# Patient Record
(demographics unavailable — no encounter records)

---

## 2024-10-23 NOTE — PHYSICAL EXAM
[Alert] : alert [Well Nourished] : well nourished [Normal Affect] : the affect was normal [Normal Mood] : the mood was normal

## 2024-10-23 NOTE — HISTORY OF PRESENT ILLNESS
[FreeTextEntry1] : Diagnosed in 2017  Diabetes associated complications: PN b/L Last eye exam: 2-2.5 yrs ago  Podiatry: 5/2024  Home diabetes medications: Basaglar 50 units HS Novolog 12-15 units pre meals (skips dose if he skips a meal)  Metformin 500 mg x2 BID (4 tabs a day) Trulicity 0.75 mg weekly  Medications previously tried: Ozempic 0.50 mg weekly - STOPPED due to GI upset  Glimepiride 4 mg BID   SMBG/CGM: Shauna  14 days average 253, %CV 24.1 >250  50% 181-250  37%   13% <70  0%  Hypoglycemia: none Severe hypoglycemia: none   Dietary patterns: Low carb 2-3 meals per day     Labs: 5/2024 A1C: 10.3% (5/2024), 10.1% (10/2024)  10/2024 Creatinine:  0.78 GFR: 103

## 2024-10-25 NOTE — HISTORY OF PRESENT ILLNESS
[FreeTextEntry1] : Oct 21, 2024    in person  PCP:  Dr. Ene Page    CC:   Diabetes             (BMI 41)               10/2017 cellulitis admission  A1c 13%;   8/2023:  8.2%                            at that time on Basaglar and metformin 1000 mg BID.  .                        urine microal/creat ratio 343           (Hx kidney stones)  58 yo visits for diabetes.  A1c in November 2022 was 10%;  August 2023  8.2% Two daughters in [place]    He remains on   Basaglar 26 -> 12 units in PM-> 28 units  and Novolog 4 -> 8 units TID BS > 140 (and having a meal) -> 50 units Insulin Aspart Flexpen 10-15 units TID  glimepiride 4 mg BID - STOPPED   metformin 500 mg x2   BID (4 tabs a day)  Ozempic 0.50 mg weekly - STOPPED  -> Trulicity  0.75 mg/week   ([employer] not covered)    Libre3 data reviewed.  GMI 9.   Reproducible spikes after meals.  : : Constitutional:  Alert, well nourished, healthy appearance, no acute distress  Eyes:  No proptosis, no stare Thyroid:  normal to palpation Pulmonary:  No respiratory distress, no accessory muscle use; normal rate and effort Cardiac:  Normal rate Vascular:  Endocrine:  No stigmata of Cushings Syndrome Musculoskeletal:  Normal gait, no involuntary movements Neurology:  No tremors Affect/Mood/Psych:  Oriented x 3; normal affect, normal insight/judgement, normal mood  . Impression:  Very sensitve to carbs. Traveling less. Plan:  Same Rx.   He has found input from CDE-NP Ольга Baca very helpful      Aug 05, 2024     in person    PCP:  Dr. Ene Page    CC:   Diabetes             (BMI 41)               10/2017 cellulitis admission  A1c 13%;   8/2023:  8.2%                            at that time on Basaglar and metformin 1000 mg BID.  .                        urine microal/creat ratio 343           (Hx kidney stones)  58 yo visits for diabetes.  A1c in November 2022 was 10%;  August 2023  8.2% Two daughters in [place]     He remains on   Basaglar 26 -> 12 units in PM-> 28 units  and Novolog 4 -> 8 units TID BS > 140 (and having a meal)  glimepiride 4 mg BID  metformin 500 mg x2   BID (4 tabs a day)  Ozempic 0.50 mg weekly - STOPPED    : : Constitutional:  Alert, well nourished, healthy appearance, no acute distress  Eyes:  No proptosis, no stare Thyroid: Pulmonary:  No respiratory distress, no accessory muscle use; normal rate and effort Cardiac:  Normal rate Vascular:  Endocrine:  No stigmata of Cushings Syndrome Musculoskeletal:  Normal gait, no involuntary movements Neurology:  No tremors Affect/Mood/Psych:  Oriented x 3; normal affect, normal insight/judgement, normal mood  . Impression:  Overworked. A1c over 10% Needs fewer carbs, more insulin, more activity.  Plan: In short run, increase Lantus by 2 units every PM until FBS better. f/u to NP MAYANKE Ольга Baca.      - May 06, 2024      in person     PCP:  Dr. Ene Page    CC:   Diabetes             (BMI 41)               10/2017 cellulitis admission  A1c 13%;   8/2023:  8.2%                            at that time on Basaglar and metformin 1000 mg BID.  .                        urine microal/creat ratio 343           (Hx kidney stones)  58 yo visits for diabetes.  A1c in November 2022 was 10%;  August 2023  8.2%  He has been traveling for work and developed a vitral syndrome (home Covid test negative) Some GI upset.    He remains on   Basaglar 26 -> 12 units in PM and Novolog 4 -> 8 units TID BS > 140 (and having a meal)  glimepiride 4 mg BID  metformin 500 mg x2   BID (4 tabs a day)  Ozempic 0.50 mg weekly - STOPPED    Impression/Plan;  Appears to be doing excellent job of monitoring and controlling diabetes. Same Rx for now sans the Ozempic. Updated A1c today     Sep 18, 2023      vid chat (last minute change b/o virus symptoms)   PCP:  Dr. Ene Page    CC:   Diabetes             (BMI 41)               10/2017 cellulitis admission  A1c 13%;   8/2023:  8.2%                            at that time on Basaglar and metformin 1000 mg BID.  .                        urine microal/creat ratio 343           (Hx kidney stones)  57 yo visits for diabetes.  A1c in November 2022 was 10%;  August 2023  8.2%  He has been traveling for work and developed a vitral syndrome (home Covid test negative) Some GI upset.     Basaglar 26 -> 12 units in PM and Novolog 4 -> 8 units TID BS > 140 (and having a meal)  glimepiride 4 mg BID  metformin 500 mg x2   BID (4 tabs a day)  Ozempic 0.50 mg weekly   Impression:  Using Freestyle Shauna 3 ([facility])  Reports sugars in good range. Last A1c was down to 8.2% so will aim for next A1c prior to December visit. Still needs to see ophthalmology.  Plan:  Updated labs prior to December visit. Can consider SGLT-2 now that sugars are under better control     Aug 21, 2023      in person       his company is [employer]  PCP:  Dr. Ene Page    CC:   Diabetes             (BMI 41)               10/2017 cellulitis admission  A1c 13%                            at that time on Basaglar and metformin 1000 mg BID.  .                        urine microal/creat ratio 343           (Hx kidney stones)  57 yo visits for diabetes.  A1c in November was 10%.  He explains that because of stress and work, he has not had time to focus on the diabetes.     Basaglar 26 units in PM and Novolog 4 units TID BS > 140 (and having a meal)  glimepiride 4 mg BID  metformin 500 mg BID  Ozempic 0.50  : : Constitutional:  Alert, well nourished, healthy appearance, no acute distress  Eyes:  No proptosis, no stare Thyroid: Pulmonary:  No respiratory distress, no accessory muscle use; normal rate and effort Cardiac:  Normal rate Vascular:  Endocrine:  No stigmata of Cushings Syndrome Musculoskeletal:  Normal gait, no involuntary movements Neurology:  No tremors Affect/Mood/Psych:  Oriented x 3; normal affect, normal insight/judgement, normal mood  . Impression:  Continues to suffer from stress eating. Plan:  Increase Basaglar to generate FBS around 140 Increase Novolog to 6 units TID to prevent spikes after meals  Increase ozempic from 0.5 to 1.0 mg/week.    Annual eye exam.   Imp:   A1c trajectory down from over 10 to 8.2% by attention  to diet.   Plan:  Increae Ozempic to 1.0 mg/week.    Jan 30, 2023  in person  CC:   Diabetes             (BMI 41)               10/2017 cellulitis admission  A1c 13%                            at that time on Basaglar and metformin 1000 mg BID.  .                        urine microal/creat ratio 343           (Hx kidney stones)  57 yo visits for diabetes.  A1c in November was 10%.  He explains that because of stress and work, he has not had time to focus on the diabetes.     Basaglar 26 units in PM and Novolog 4 units TID BS > 140 (and having a meal)  glimepiride 4 mg BID  metformin 500 mg BID  Ozempic 0.25  : : Constitutional:  Alert, well nourished, healthy appearance, no acute distress  Eyes:  No proptosis, no stare Thyroid: Pulmonary:  No respiratory distress, no accessory muscle use; normal rate and effort Cardiac:  Normal rate Vascular:  Endocrine:  No stigmata of Cushing's Syndrome Musculoskeletal:  Normal gait, no involuntary movements Neurology:  No tremors Affect/Mood/Psych:  Oriented x 3; normal affect, normal insight/judgement, normal mood  .  Impression:  Review of Libre3 data shows room to increase Basaglar until  FBS around 140. Increase the Novolog to 6 units.  : : Constitutional:  Alert, well nourished, healthy appearance, no acute distress  Eyes:  No proptosis, no stare Thyroid: Pulmonary:  No respiratory distress, no accessory muscle use; normal rate and effort Cardiac:  Normal rate Vascular:  Endocrine:  No stigmata of Cushing's Syndrome Musculoskeletal:  Normal gait, no involuntary movements Neurology:  No tremors Affect/Mood/Psych:  Oriented x 3; normal affect, normal insight/judgement, normal mood  .  Impression:  Room for improvement. Plan:  [facility] to [facility]in [place].  at [facility].            Jan 30, 2023    in person  CC:   Diabetes             (BMI 41)               10/2017 cellulitis admission  A1c 13%                            at that time on Basaglar and metformin 1000 mg BID.  .                        urine microal/creat ratio 343           (Hx kidney stones)  57 yo visits for diabetes.  A1c in November was 10%, so I encouraged him to come in today.   He explains that because of stress and work, he has not had time to focus on the diabetes.     Basaglar 14 units in PM and Novolog 4 units TID BS > 140 (and having a meal)  glimepiride 4 mg BID  metformin 500 mg BID  In Nov 2020 insulin 21.1 when  In July, when BS was 212, C-peptide was only 4.9    Using Shauna 2 (from [employer]) $25/month    : :  Plan:  Shauna 3 yumiko installed and he will try out the Shauna 3 Even if it is not covered by his insurance, he would like to continue with it.   I have asked him to increase the Basaglar inssulin by 1 unit every 2 days until he is waking up with FBS around 140 mg/dl  Nov 28, 2022    in person  CC:   Diabetes             (BMI 41)               10/2017 cellulitis admission  A1c 13%                            at that time on Basaglar and metformin 1000 mg BID.  .                        urine microal/creat ratio 343           (Hx kidney stones)  57 yo visits for diabetes.   Basaglar 12 units in PM and Novolog 4 units TID BS > 140 (and having a meal)  glimepiride 4 mg BID  metformin 500 mg BID  In Nov 2020 insulin 21.1 when  In July, when BS was 212, C-peptide was only 4.9    Using Shauna 2 (from [employer]) $25/month    : : Constitutional:  Alert, well nourished, healthy appearance, no acute distress  Eyes:  No proptosis, no stare Thyroid: Pulmonary:  No respiratory distress, no accessory muscle use; normal rate and effort Cardiac:  Normal rate Vascular:  Endocrine:  No stigmata of Cushing's Syndrome Musculoskeletal:  Normal gait, no involuntary movements Neurology:  No tremors Affect/Mood/Psych:  Oriented x 3; normal affect, normal insight/judgement, normal mood  .   Impression:  Available data shows blood sugars still quite elevated despite use of CGM, insulin. Plan:  Will negotiate with him for nutritional counseling.    Jul 19, 2022      in person  PCP:   Dr. Ene Page and Dr. Gamez           GI:  Dr. Quiroga in [place]  CC:   Diabetes             10/2017 cellulitis admission  A1c 13%                            at that time on Basaglar and metformin 1000 mg BID.  .          (Hx kidney stones)    58 yo visits for diabetes.   Last seen May 2021 and April 2022.   Tried CGM - fell off though.   Taking glimepiride 4 mg, one BID but he can take both in the evening.  and metformin 500 mg BID    4/2022 A1c 10.6 %    He finds Contour more accurate than his previous TrueMetrix.   Sugars up to 260 when he had Covid 19 in March, but FBS still elevated. around 200 mg/dl   Meter not available today.    : : Constitutional:  Alert, well nourished, healthy appearance, no acute distress  Eyes:  No proptosis, no stare Thyroid:  Normal to palpation Pulmonary:  No respiratory distress, no accessory muscle use; normal rate and effort Cardiac:  Normal rate Vascular:  Endocrine:  No stigmata of Cushing's Syndrome Musculoskeletal:  Normal gait, no involuntary movements Neurology:  No tremors Affect/Mood/Psych:  Oriented x 3; normal affect, normal insight/judgement, normal mood  .  Impression:  Most recent A1c 10.6% Evolved into Type 1 diabetees. Previously tried GLP-1 agonist, but upset his stomach  Plan:  Basaglar 12 units in PM and Novolog 4 units TID BS > 140 (and having a meal) ROV in November and January.       Apr 06, 2022       PCP:   Dr. Ene Page           GI:  Dr. Quiroga in [place]  CC:   Diabetes  55 yo visits for diabetes.   Last seen May 2021. Taking glimepiride 4 mg, two in PM and metformin 500 mg BID  He believes that it is likely A1c will have drifted up.  : : Constitutional:  Alert, well nourished, healthy appearance, no acute distress  Eyes:  No proptosis, no stare Thyroid: Pulmonary:  No respiratory distress, no accessory muscle use; normal rate and effort Cardiac:  Normal rate Vascular:  Endocrine:  No stigmata of Cushing's Syndrome Musculoskeletal:  Normal gait, no involuntary movements Neurology:  No tremors Affect/Mood/Psych:  Oriented x 3; normal affect, normal insight/judgement, normal mood  .  Imp/Plan:  Labs today, call me one week.  Return by July.       May 05, 2021    telephonic (iPhone does not )  PCP:   Dr. Ene Page           GI:  Dr. Quiroga in [place]   CC:   Diabetes  55 yo visits for diabetes.    Taking glimepiride 4 mg, two in PM and metformin 500 mg BID  Has used Shauna 14; but it tends to fall off, so he is using fingerstick BS and reports afternoon within normal range but fasting still somewhat elevated.  Impression:  A1c trending in good direction. May benefit from addition of GLP-1 and/or SGLT-2 inhibitor.  Plan:  Updated labs at [facility] (Rx mailed to him), included repeat Te. ROV in about 3 months.    He will also f/u PCP - Dr. Page.  Annual eye exam   Jan 26, 2021     videochat iPhone  PCP:   Dr. Ene Page           GI:  Dr. Quiroga in [place]   CC:   Diabetes  55 yo visits for diabetes.   At November visit, started on glimepiride and metform and has tolerated them well. No hypoglycemia.  Using Freestyle Shauna 2 as well and he finds it educational and helpful.   A1c yesterday 7.8%, down from 11.4 in November U/A shows significant microalbuminuria.   On ACEI.  Impression:  He has made significant progress in controlling the sugars.   He understands ADA goals and that we will be working on even more improvement. he has had eyes checked, has not needed podiatrist, anticipates a routine GI evaluation.  Plan:  Rx renewed and encouraged him to visit in person by April 2021.     Nov 04, 2020     in person  PCP:   Dr. Ene Page           GI:  Dr. 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  On no medications for the diabetes at the present time.     Took Basaglar in the past.  Examination:  Constitutional:  Alert, well nourished, healthy appearance, no acute distress  Eyes:  No proptosis, no lid lag Thyroid: Pulmonary:  No respiratory distress, no accessory muscle use; normal rate and effort Cardiac:  Normal rate Vascular:  Endocrine:  No stigmata of Cushing's Syndrome Musculoskeletal:  Normal gait, no involuntary movements Neurology:  No tremors Affect/Mood/Psych:  Oriented x 3; normal affect, normal insight/judgement, normal mood  .   Impression:  Likely that diabetes is under loose control as Mr. Hammer has not had time to monitor sugar levels. He seems eager to restart.    Plan:  glimepiride 4 mg bId metformin 500 mg BID Call me on my cell phone over the weekend to review fingerstick sugar and lab results     2018 medications:     Taking OneTouch Verio - Strip as directed In Vitro check sugar twice daily, Notes: dx code E11.9, Taking OneTouch Delica Lancets Fine - Miscellaneous as directed subcutaneously check sugar twice daily, Notes: dx code E11.9, Taking Aspir-81 81 MG Tablet Delayed Release 1 tablet Orally Once a day, Taking Lisinopril 5 MG Tablet 1 tablet Orally Once a day, Taking MetFORMIN HCl  MG Tablet Extended Release 24 Hour 2 tablets Orally twice daily with food, Taking Trulicity 0.75 MG/0.5ML Solution Pen-injector as directed Subcutaneous once weekly, Taking Dexamethasone 1 MG Tablet 1 tablet Orally take at 11pm the night before blood work, Taking Basaglar KwikPen 100 UNIT/ML Solution Pen-injector 16 units Subcutaneous bedtime, Taking Ergocalciferol 26163 UNIT Capsule 1 capsule Orally once weekly, Not-Taking Trulicity 0.75 MG/0.5ML Solution Pen-injector 0.5 ml Subcutaneous once weekly, Not-Taking Victoza 18 MG/3ML Solution Pen-injector 0.6mg daily x 1 week then increase to 1.2mg daily subcutaneously , Not-Taking Acidophilus - Capsule Orally

## 2024-10-25 NOTE — HISTORY OF PRESENT ILLNESS
[FreeTextEntry1] : Oct 21, 2024    in person  PCP:  Dr. Ene Page    CC:   Diabetes             (BMI 41)               10/2017 cellulitis admission  A1c 13%;   8/2023:  8.2%                            at that time on Basaglar and metformin 1000 mg BID.  .                        urine microal/creat ratio 343           (Hx kidney stones)  58 yo visits for diabetes.  A1c in November 2022 was 10%;  August 2023  8.2% Two daughters in [place]    He remains on   Basaglar 26 -> 12 units in PM-> 28 units  and Novolog 4 -> 8 units TID BS > 140 (and having a meal) -> 50 units Insulin Aspart Flexpen 10-15 units TID  glimepiride 4 mg BID - STOPPED   metformin 500 mg x2   BID (4 tabs a day)  Ozempic 0.50 mg weekly - STOPPED  -> Trulicity  0.75 mg/week   ([employer] not covered)    Libre3 data reviewed.  GMI 9.   Reproducible spikes after meals.  : : Constitutional:  Alert, well nourished, healthy appearance, no acute distress  Eyes:  No proptosis, no stare Thyroid:  normal to palpation Pulmonary:  No respiratory distress, no accessory muscle use; normal rate and effort Cardiac:  Normal rate Vascular:  Endocrine:  No stigmata of Cushings Syndrome Musculoskeletal:  Normal gait, no involuntary movements Neurology:  No tremors Affect/Mood/Psych:  Oriented x 3; normal affect, normal insight/judgement, normal mood  . Impression:  Very sensitve to carbs. Traveling less. Plan:  Same Rx.   He has found input from CDE-NP Ольга Baca very helpful      Aug 05, 2024     in person    PCP:  Dr. Ene Page    CC:   Diabetes             (BMI 41)               10/2017 cellulitis admission  A1c 13%;   8/2023:  8.2%                            at that time on Basaglar and metformin 1000 mg BID.  .                        urine microal/creat ratio 343           (Hx kidney stones)  58 yo visits for diabetes.  A1c in November 2022 was 10%;  August 2023  8.2% Two daughters in [place]     He remains on   Basaglar 26 -> 12 units in PM-> 28 units  and Novolog 4 -> 8 units TID BS > 140 (and having a meal)  glimepiride 4 mg BID  metformin 500 mg x2   BID (4 tabs a day)  Ozempic 0.50 mg weekly - STOPPED    : : Constitutional:  Alert, well nourished, healthy appearance, no acute distress  Eyes:  No proptosis, no stare Thyroid: Pulmonary:  No respiratory distress, no accessory muscle use; normal rate and effort Cardiac:  Normal rate Vascular:  Endocrine:  No stigmata of Cushings Syndrome Musculoskeletal:  Normal gait, no involuntary movements Neurology:  No tremors Affect/Mood/Psych:  Oriented x 3; normal affect, normal insight/judgement, normal mood  . Impression:  Overworked. A1c over 10% Needs fewer carbs, more insulin, more activity.  Plan: In short run, increase Lantus by 2 units every PM until FBS better. f/u to NP MAYANKE Ольга Baca.      - May 06, 2024      in person     PCP:  Dr. Ene Page    CC:   Diabetes             (BMI 41)               10/2017 cellulitis admission  A1c 13%;   8/2023:  8.2%                            at that time on Basaglar and metformin 1000 mg BID.  .                        urine microal/creat ratio 343           (Hx kidney stones)  58 yo visits for diabetes.  A1c in November 2022 was 10%;  August 2023  8.2%  He has been traveling for work and developed a vitral syndrome (home Covid test negative) Some GI upset.    He remains on   Basaglar 26 -> 12 units in PM and Novolog 4 -> 8 units TID BS > 140 (and having a meal)  glimepiride 4 mg BID  metformin 500 mg x2   BID (4 tabs a day)  Ozempic 0.50 mg weekly - STOPPED    Impression/Plan;  Appears to be doing excellent job of monitoring and controlling diabetes. Same Rx for now sans the Ozempic. Updated A1c today     Sep 18, 2023      vid chat (last minute change b/o virus symptoms)   PCP:  Dr. Ene Page    CC:   Diabetes             (BMI 41)               10/2017 cellulitis admission  A1c 13%;   8/2023:  8.2%                            at that time on Basaglar and metformin 1000 mg BID.  .                        urine microal/creat ratio 343           (Hx kidney stones)  57 yo visits for diabetes.  A1c in November 2022 was 10%;  August 2023  8.2%  He has been traveling for work and developed a vitral syndrome (home Covid test negative) Some GI upset.     Basaglar 26 -> 12 units in PM and Novolog 4 -> 8 units TID BS > 140 (and having a meal)  glimepiride 4 mg BID  metformin 500 mg x2   BID (4 tabs a day)  Ozempic 0.50 mg weekly   Impression:  Using Freestyle Shauna 3 ([facility])  Reports sugars in good range. Last A1c was down to 8.2% so will aim for next A1c prior to December visit. Still needs to see ophthalmology.  Plan:  Updated labs prior to December visit. Can consider SGLT-2 now that sugars are under better control     Aug 21, 2023      in person       his company is [employer]  PCP:  Dr. Ene Page    CC:   Diabetes             (BMI 41)               10/2017 cellulitis admission  A1c 13%                            at that time on Basaglar and metformin 1000 mg BID.  .                        urine microal/creat ratio 343           (Hx kidney stones)  57 yo visits for diabetes.  A1c in November was 10%.  He explains that because of stress and work, he has not had time to focus on the diabetes.     Basaglar 26 units in PM and Novolog 4 units TID BS > 140 (and having a meal)  glimepiride 4 mg BID  metformin 500 mg BID  Ozempic 0.50  : : Constitutional:  Alert, well nourished, healthy appearance, no acute distress  Eyes:  No proptosis, no stare Thyroid: Pulmonary:  No respiratory distress, no accessory muscle use; normal rate and effort Cardiac:  Normal rate Vascular:  Endocrine:  No stigmata of Cushings Syndrome Musculoskeletal:  Normal gait, no involuntary movements Neurology:  No tremors Affect/Mood/Psych:  Oriented x 3; normal affect, normal insight/judgement, normal mood  . Impression:  Continues to suffer from stress eating. Plan:  Increase Basaglar to generate FBS around 140 Increase Novolog to 6 units TID to prevent spikes after meals  Increase ozempic from 0.5 to 1.0 mg/week.    Annual eye exam.   Imp:   A1c trajectory down from over 10 to 8.2% by attention  to diet.   Plan:  Increae Ozempic to 1.0 mg/week.    Jan 30, 2023  in person  CC:   Diabetes             (BMI 41)               10/2017 cellulitis admission  A1c 13%                            at that time on Basaglar and metformin 1000 mg BID.  .                        urine microal/creat ratio 343           (Hx kidney stones)  57 yo visits for diabetes.  A1c in November was 10%.  He explains that because of stress and work, he has not had time to focus on the diabetes.     Basaglar 26 units in PM and Novolog 4 units TID BS > 140 (and having a meal)  glimepiride 4 mg BID  metformin 500 mg BID  Ozempic 0.25  : : Constitutional:  Alert, well nourished, healthy appearance, no acute distress  Eyes:  No proptosis, no stare Thyroid: Pulmonary:  No respiratory distress, no accessory muscle use; normal rate and effort Cardiac:  Normal rate Vascular:  Endocrine:  No stigmata of Cushing's Syndrome Musculoskeletal:  Normal gait, no involuntary movements Neurology:  No tremors Affect/Mood/Psych:  Oriented x 3; normal affect, normal insight/judgement, normal mood  .  Impression:  Review of Libre3 data shows room to increase Basaglar until  FBS around 140. Increase the Novolog to 6 units.  : : Constitutional:  Alert, well nourished, healthy appearance, no acute distress  Eyes:  No proptosis, no stare Thyroid: Pulmonary:  No respiratory distress, no accessory muscle use; normal rate and effort Cardiac:  Normal rate Vascular:  Endocrine:  No stigmata of Cushing's Syndrome Musculoskeletal:  Normal gait, no involuntary movements Neurology:  No tremors Affect/Mood/Psych:  Oriented x 3; normal affect, normal insight/judgement, normal mood  .  Impression:  Room for improvement. Plan:  [facility] to [facility]in [place].  at [facility].            Jan 30, 2023    in person  CC:   Diabetes             (BMI 41)               10/2017 cellulitis admission  A1c 13%                            at that time on Basaglar and metformin 1000 mg BID.  .                        urine microal/creat ratio 343           (Hx kidney stones)  57 yo visits for diabetes.  A1c in November was 10%, so I encouraged him to come in today.   He explains that because of stress and work, he has not had time to focus on the diabetes.     Basaglar 14 units in PM and Novolog 4 units TID BS > 140 (and having a meal)  glimepiride 4 mg BID  metformin 500 mg BID  In Nov 2020 insulin 21.1 when  In July, when BS was 212, C-peptide was only 4.9    Using Shauna 2 (from [employer]) $25/month    : :  Plan:  Shauna 3 yumiko installed and he will try out the Shauna 3 Even if it is not covered by his insurance, he would like to continue with it.   I have asked him to increase the Basaglar inssulin by 1 unit every 2 days until he is waking up with FBS around 140 mg/dl  Nov 28, 2022    in person  CC:   Diabetes             (BMI 41)               10/2017 cellulitis admission  A1c 13%                            at that time on Basaglar and metformin 1000 mg BID.  .                        urine microal/creat ratio 343           (Hx kidney stones)  57 yo visits for diabetes.   Basaglar 12 units in PM and Novolog 4 units TID BS > 140 (and having a meal)  glimepiride 4 mg BID  metformin 500 mg BID  In Nov 2020 insulin 21.1 when  In July, when BS was 212, C-peptide was only 4.9    Using Shauna 2 (from [employer]) $25/month    : : Constitutional:  Alert, well nourished, healthy appearance, no acute distress  Eyes:  No proptosis, no stare Thyroid: Pulmonary:  No respiratory distress, no accessory muscle use; normal rate and effort Cardiac:  Normal rate Vascular:  Endocrine:  No stigmata of Cushing's Syndrome Musculoskeletal:  Normal gait, no involuntary movements Neurology:  No tremors Affect/Mood/Psych:  Oriented x 3; normal affect, normal insight/judgement, normal mood  .   Impression:  Available data shows blood sugars still quite elevated despite use of CGM, insulin. Plan:  Will negotiate with him for nutritional counseling.    Jul 19, 2022      in person  PCP:   Dr. Ene Page and Dr. Gamez           GI:  Dr. Quiroga in [place]  CC:   Diabetes             10/2017 cellulitis admission  A1c 13%                            at that time on Basaglar and metformin 1000 mg BID.  .          (Hx kidney stones)    58 yo visits for diabetes.   Last seen May 2021 and April 2022.   Tried CGM - fell off though.   Taking glimepiride 4 mg, one BID but he can take both in the evening.  and metformin 500 mg BID    4/2022 A1c 10.6 %    He finds Contour more accurate than his previous TrueMetrix.   Sugars up to 260 when he had Covid 19 in March, but FBS still elevated. around 200 mg/dl   Meter not available today.    : : Constitutional:  Alert, well nourished, healthy appearance, no acute distress  Eyes:  No proptosis, no stare Thyroid:  Normal to palpation Pulmonary:  No respiratory distress, no accessory muscle use; normal rate and effort Cardiac:  Normal rate Vascular:  Endocrine:  No stigmata of Cushing's Syndrome Musculoskeletal:  Normal gait, no involuntary movements Neurology:  No tremors Affect/Mood/Psych:  Oriented x 3; normal affect, normal insight/judgement, normal mood  .  Impression:  Most recent A1c 10.6% Evolved into Type 1 diabetees. Previously tried GLP-1 agonist, but upset his stomach  Plan:  Basaglar 12 units in PM and Novolog 4 units TID BS > 140 (and having a meal) ROV in November and January.       Apr 06, 2022       PCP:   Dr. Ene Page           GI:  Dr. Quiroga in [place]  CC:   Diabetes  55 yo visits for diabetes.   Last seen May 2021. Taking glimepiride 4 mg, two in PM and metformin 500 mg BID  He believes that it is likely A1c will have drifted up.  : : Constitutional:  Alert, well nourished, healthy appearance, no acute distress  Eyes:  No proptosis, no stare Thyroid: Pulmonary:  No respiratory distress, no accessory muscle use; normal rate and effort Cardiac:  Normal rate Vascular:  Endocrine:  No stigmata of Cushing's Syndrome Musculoskeletal:  Normal gait, no involuntary movements Neurology:  No tremors Affect/Mood/Psych:  Oriented x 3; normal affect, normal insight/judgement, normal mood  .  Imp/Plan:  Labs today, call me one week.  Return by July.       May 05, 2021    telephonic (iPhone does not )  PCP:   Dr. Ene Page           GI:  Dr. Quiroga in [place]   CC:   Diabetes  55 yo visits for diabetes.    Taking glimepiride 4 mg, two in PM and metformin 500 mg BID  Has used Shauna 14; but it tends to fall off, so he is using fingerstick BS and reports afternoon within normal range but fasting still somewhat elevated.  Impression:  A1c trending in good direction. May benefit from addition of GLP-1 and/or SGLT-2 inhibitor.  Plan:  Updated labs at [place] (Rx mailed to him), included repeat Te. ROV in about 3 months.    He will also f/u PCP - Dr. Page.  Annual eye exam   Jan 26, 2021     videochat iPhone  PCP:   Dr. Ene Page           GI:  Dr. Quiroga in [place]   CC:   Diabetes  55 yo visits for diabetes.   At November visit, started on glimepiride and metform and has tolerated them well. No hypoglycemia.  Using Freestyle Shauna 2 as well and he finds it educational and helpful.   A1c yesterday 7.8%, down from 11.4 in November U/A shows significant microalbuminuria.   On ACEI.  Impression:  He has made significant progress in controlling the sugars.   He understands ADA goals and that we will be working on even more improvement. he has had eyes checked, has not needed podiatrist, anticipates a routine GI evaluation.  Plan:  Rx renewed and encouraged him to visit in person by April 2021.     Nov 04, 2020     in person  PCP:   Dr. Ene Page           GI:  Dr. 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  On no medications for the diabetes at the present time.     Took Basaglar in the past.  Examination:  Constitutional:  Alert, well nourished, healthy appearance, no acute distress  Eyes:  No proptosis, no lid lag Thyroid: Pulmonary:  No respiratory distress, no accessory muscle use; normal rate and effort Cardiac:  Normal rate Vascular:  Endocrine:  No stigmata of Cushing's Syndrome Musculoskeletal:  Normal gait, no involuntary movements Neurology:  No tremors Affect/Mood/Psych:  Oriented x 3; normal affect, normal insight/judgement, normal mood  .   Impression:  Likely that diabetes is under loose control as Mr. Hamemr has not had time to monitor sugar levels. He seems eager to restart.    Plan:  glimepiride 4 mg bId metformin 500 mg BID Call me on my cell phone over the weekend to review fingerstick sugar and lab results     2018 medications:     Taking OneTouch Verio - Strip as directed In Vitro check sugar twice daily, Notes: dx code E11.9, Taking OneTouch Delica Lancets Fine - Miscellaneous as directed subcutaneously check sugar twice daily, Notes: dx code E11.9, Taking Aspir-81 81 MG Tablet Delayed Release 1 tablet Orally Once a day, Taking Lisinopril 5 MG Tablet 1 tablet Orally Once a day, Taking MetFORMIN HCl  MG Tablet Extended Release 24 Hour 2 tablets Orally twice daily with food, Taking Trulicity 0.75 MG/0.5ML Solution Pen-injector as directed Subcutaneous once weekly, Taking Dexamethasone 1 MG Tablet 1 tablet Orally take at 11pm the night before blood work, Taking Basaglar KwikPen 100 UNIT/ML Solution Pen-injector 16 units Subcutaneous bedtime, Taking Ergocalciferol 30172 UNIT Capsule 1 capsule Orally once weekly, Not-Taking Trulicity 0.75 MG/0.5ML Solution Pen-injector 0.5 ml Subcutaneous once weekly, Not-Taking Victoza 18 MG/3ML Solution Pen-injector 0.6mg daily x 1 week then increase to 1.2mg daily subcutaneously , Not-Taking Acidophilus - Capsule Orally

## 2024-12-04 NOTE — HISTORY OF PRESENT ILLNESS
[FreeTextEntry1] : Diagnosed in 2017  Diabetes associated complications: PN b/L Last eye exam: 2-2.5 yrs ago  Podiatry: 5/2024  Sleeping better. Travelling less for work.  Home diabetes medications: Basaglar 50 units HS Novolog 15 units pre meals (skips dose if he skips a meal)  Metformin 500 mg x2 BID (4 tabs a day) Trulicity 0.75 mg weekly-- was unable to fill the 1.5 mg dose  Medications previously tried: Ozempic 0.50 mg weekly - STOPPED due to GI upset  Glimepiride 4 mg BID   SMBG/CGM: Shauna  14 days average 276, %CV 18.9% >250  69% 181-250  30%   1% <70  0%  Hypoglycemia: none Severe hypoglycemia: none   Dietary patterns: Low carb 2-3 meals per day     Labs: 5/2024 A1C: 10.3% (5/2024), 10.1% (10/2024)  10/2024 Creatinine:  0.78 GFR: 103

## 2025-01-08 NOTE — HEALTH RISK ASSESSMENT
[Good] : ~his/her~  mood as  good [Yes] : Yes [Monthly or less (1 pt)] : Monthly or less (1 point) [1 or 2 (0 pts)] : 1 or 2 (0 points) [No falls in past year] : Patient reported no falls in the past year [0] : 2) Feeling down, depressed, or hopeless: Not at all (0) [PHQ-2 Negative - No further assessment needed] : PHQ-2 Negative - No further assessment needed [Never] : Never [NO] : No [Patient reported colonoscopy was normal] : Patient reported colonoscopy was normal [With Family] : lives with family [Employed] : employed [Feels Safe at Home] : Feels safe at home [Fully functional (bathing, dressing, toileting, transferring, walking, feeding)] : Fully functional (bathing, dressing, toileting, transferring, walking, feeding) [Fully functional (using the telephone, shopping, preparing meals, housekeeping, doing laundry, using] : Fully functional and needs no help or supervision to perform IADLs (using the telephone, shopping, preparing meals, housekeeping, doing laundry, using transportation, managing medications and managing finances) [Reports normal functional visual acuity (ie: able to read med bottle)] : Reports normal functional visual acuity [No Retinopathy] : No retinopathy [HIV test declined] : HIV test declined [Audit-CScore] : 0 [de-identified] : walking and lite weights. [de-identified] : fair to poor at times ( tries to avoid sweets)- eats meats. He is trying to avoid breads, pasta, etc. 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

## 2025-01-08 NOTE — REVIEW OF SYSTEMS
[Negative] : Psychiatric [Recent Change In Weight] : ~T no recent weight change [Vision Problems] : no vision problems [de-identified] : rosacea on the face

## 2025-01-08 NOTE — HISTORY OF PRESENT ILLNESS
[FreeTextEntry1] :  Establish care [de-identified] : Mr. ADOLPH NICHOLS is a 60 year old male here today to re-establish care. Had a GI virus last week- feeling better.  Hx of DM- eating has not been great but sugars are getting better since he got back from travelling PCV: Flu: Refused TdaP: 6-7 yrs ( [place])

## 2025-01-27 NOTE — HISTORY OF PRESENT ILLNESS
[FreeTextEntry1] : Jan 27, 2025    in person  PCP:  Dr. Dennis Gamez   CC:   Diabetes             (BMI 41)               10/2017 cellulitis admission  A1c 13%;   8/2023:  8.2%         1/2025  9%                          at that time on Basaglar and metformin 1000 mg BID.  .                        urine microal/creat ratio 343           (Hx kidney stones)             1/12/2025 25 [place] vit D 14.3   61 yo visits for diabetes.  A1c in November 2022 was 10%;  August 2023  8.2% [place] in [place]   Recently (1/2025)  had various illnesses, with GI symptoms, URI.  He remains on   Basaglar 40-50 units in PM   and Novolog 4 -> 8 units TID BS > 140 (and having a meal) -> 50 units (now Semglee) Insulin Aspart Flexpen 10-15 units TID  glimepiride 4 mg BID - STOPPED   metformin 500 mg x2   BID (4 tabs a day)  Ozempic 0.50 mg weekly -> diarrhea.  - STOPPED  -> Trulicity  0.75 mg/week -> 1.5 mg weekly  ([employer] not covered)    Libre3 data reviewed.  GMI 9.   Reproducible spikes after meals.  : : Constitutional:  Alert, well nourished, healthy appearance, no acute distress  Eyes:  No proptosis, no stare Thyroid:  normal to palpation Pulmonary:  No respiratory distress, no accessory muscle use; normal rate and effort Cardiac:  Normal rate Vascular:  Endocrine:  No stigmata of Cushings Syndrome Musculoskeletal:  Normal gait, no involuntary movements Neurology:  No tremors Affect/Mood/Psych:  Oriented x 3; normal affect, normal insight/judgement, normal mood  . Impression:  Very sensitve to carbs. Te bacck up as stress has decreased. Traveling less. Plan:  Same Rx.   He has found input from CDE-NP Ольга Baca very helpful      Aug 05, 2024     in person    PCP:  Dr. Ene Page    CC:   Diabetes             (BMI 41)               10/2017 cellulitis admission  A1c 13%;   8/2023:  8.2%                            at that time on Basaglar and metformin 1000 mg BID.  .                        urine microal/creat ratio 343           (Hx kidney stones)  60 yo visits for diabetes.  A1c in November 2022 was 10%;  August 2023  8.2% Two daughters in [place]     He remains on   Basaglar 26 -> 12 units in PM-> 28 units  and Novolog 4 -> 8 units TID BS > 140 (and having a meal)  glimepiride 4 mg BID  metformin 500 mg x2   BID (4 tabs a day)  Ozempic 0.50 mg weekly - STOPPED    : : Constitutional:  Alert, well nourished, healthy appearance, no acute distress  Eyes:  No proptosis, no stare Thyroid: Pulmonary:  No respiratory distress, no accessory muscle use; normal rate and effort Cardiac:  Normal rate Vascular:  Endocrine:  No stigmata of Cushings Syndrome Musculoskeletal:  Normal gait, no involuntary movements Neurology:  No tremors Affect/Mood/Psych:  Oriented x 3; normal affect, normal insight/judgement, normal mood  . Impression:  Overworked. A1c over 10% Needs fewer carbs, more insulin, more activity.  Plan: In short run, increase Lantus by 2 units every PM until FBS better. f/u to NP MAYANKE Ольга Baca.      - May 06, 2024      in person     PCP:  Dr. Ene Page    CC:   Diabetes             (BMI 41)               10/2017 cellulitis admission  A1c 13%;   8/2023:  8.2%                            at that time on Basaglar and metformin 1000 mg BID.  .                        urine microal/creat ratio 343           (Hx kidney stones)  60 yo visits for diabetes.  A1c in November 2022 was 10%;  August 2023  8.2%  He has been traveling for work and developed a vitral syndrome (home Covid test negative) Some GI upset.    He remains on   Basaglar 26 -> 12 units in PM and Novolog 4 -> 8 units TID BS > 140 (and having a meal)  glimepiride 4 mg BID  metformin 500 mg x2   BID (4 tabs a day)  Ozempic 0.50 mg weekly - STOPPED    Impression/Plan;  Appears to be doing excellent job of monitoring and controlling diabetes. Same Rx for now sans the Ozempic. Updated A1c today     Sep 18, 2023      vid chat (last minute change b/o virus symptoms)   PCP:  Dr. Ene Page    CC:   Diabetes             (BMI 41)               10/2017 cellulitis admission  A1c 13%;   8/2023:  8.2%                            at that time on Basaglar and metformin 1000 mg BID.  .                        urine microal/creat ratio 343           (Hx kidney stones)  59 yo visits for diabetes.  A1c in November 2022 was 10%;  August 2023  8.2%  He has been traveling for work and developed a vitral syndrome (home Covid test negative) Some GI upset.     Basaglar 26 -> 12 units in PM and Novolog 4 -> 8 units TID BS > 140 (and having a meal)  glimepiride 4 mg BID  metformin 500 mg x2   BID (4 tabs a day)  Ozempic 0.50 mg weekly   Impression:  Using [employer] Shauna 3 ([facility])  Reports sugars in good range. Last A1c was down to 8.2% so will aim for next A1c prior to December visit. Still needs to see ophthalmology.  Plan:  Updated labs prior to December visit. Can consider SGLT-2 now that sugars are under better control     Aug 21, 2023      in person       his company is [employer]  PCP:  Dr. Ene Page    CC:   Diabetes             (BMI 41)               10/2017 cellulitis admission  A1c 13%                            at that time on Basaglar and metformin 1000 mg BID.  .                        urine microal/creat ratio 343           (Hx kidney stones)  59 yo visits for diabetes.  A1c in November was 10%.  He explains that because of stress and work, he has not had time to focus on the diabetes.     Basaglar 26 units in PM and Novolog 4 units TID BS > 140 (and having a meal)  glimepiride 4 mg BID  metformin 500 mg BID  Ozempic 0.50  : : Constitutional:  Alert, well nourished, healthy appearance, no acute distress  Eyes:  No proptosis, no stare Thyroid: Pulmonary:  No respiratory distress, no accessory muscle use; normal rate and effort Cardiac:  Normal rate Vascular:  Endocrine:  No stigmata of Cushings Syndrome Musculoskeletal:  Normal gait, no involuntary movements Neurology:  No tremors Affect/Mood/Psych:  Oriented x 3; normal affect, normal insight/judgement, normal mood  . Impression:  Continues to suffer from stress eating. Plan:  Increase Basaglar to generate FBS around 140 Increase Novolog to 6 units TID to prevent spikes after meals  Increase ozempic from 0.5 to 1.0 mg/week.    Annual eye exam.   Imp:   A1c trajectory down from over 10 to 8.2% by attention  to diet.   Plan:  Increae Ozempic to 1.0 mg/week.    Jan 30, 2023  in person  CC:   Diabetes             (BMI 41)               10/2017 cellulitis admission  A1c 13%                            at that time on Basaglar and metformin 1000 mg BID.  .                        urine microal/creat ratio 343           (Hx kidney stones)  59 yo visits for diabetes.  A1c in November was 10%.  He explains that because of stress and work, he has not had time to focus on the diabetes.     Basaglar 26 units in PM and Novolog 4 units TID BS > 140 (and having a meal)  glimepiride 4 mg BID  metformin 500 mg BID  Ozempic 0.25  : : Constitutional:  Alert, well nourished, healthy appearance, no acute distress  Eyes:  No proptosis, no stare Thyroid: Pulmonary:  No respiratory distress, no accessory muscle use; normal rate and effort Cardiac:  Normal rate Vascular:  Endocrine:  No stigmata of Cushing's Syndrome Musculoskeletal:  Normal gait, no involuntary movements Neurology:  No tremors Affect/Mood/Psych:  Oriented x 3; normal affect, normal insight/judgement, normal mood  .  Impression:  Review of Libre3 data shows room to increase Basaglar until  FBS around 140. Increase the Novolog to 6 units.  : : Constitutional:  Alert, well nourished, healthy appearance, no acute distress  Eyes:  No proptosis, no stare Thyroid: Pulmonary:  No respiratory distress, no accessory muscle use; normal rate and effort Cardiac:  Normal rate Vascular:  Endocrine:  No stigmata of Cushing's Syndrome Musculoskeletal:  Normal gait, no involuntary movements Neurology:  No tremors Affect/Mood/Psych:  Oriented x 3; normal affect, normal insight/judgement, normal mood  .  Impression:  Room for improvement. Plan:  [facility] to [facility]in [place].  at [facility].            Jan 30, 2023    in person  CC:   Diabetes             (BMI 41)               10/2017 cellulitis admission  A1c 13%                            at that time on Basaglar and metformin 1000 mg BID.  .                        urine microal/creat ratio 343           (Hx kidney stones)  59 yo visits for diabetes.  A1c in November was 10%, so I encouraged him to come in today.   He explains that because of stress and work, he has not had time to focus on the diabetes.     Basaglar 14 units in PM and Novolog 4 units TID BS > 140 (and having a meal)  glimepiride 4 mg BID  metformin 500 mg BID  In Nov 2020 insulin 21.1 when  In July, when BS was 212, C-peptide was only 4.9    Using Shauna 2 (from [facility] [facility] [employer]) $25/month    : :  Plan:  Shauna 3 yumiko installed and he will try out the Shauna 3 Even if it is not covered by his insurance, he would like to continue with it.   I have asked him to increase the Basaglar inssulin by 1 unit every 2 days until he is waking up with FBS around 140 mg/dl  Nov 28, 2022    in person  CC:   Diabetes             (BMI 41)               10/2017 cellulitis admission  A1c 13%                            at that time on Basaglar and metformin 1000 mg BID.  .                        urine microal/creat ratio 343           (Hx kidney stones)  59 yo visits for diabetes.   Basaglar 12 units in PM and Novolog 4 units TID BS > 140 (and having a meal)  glimepiride 4 mg BID  metformin 500 mg BID  In Nov 2020 insulin 21.1 when  In July, when BS was 212, C-peptide was only 4.9    Using Shauna 2 (from [facility] [facility] [employer]) $25/month    : : Constitutional:  Alert, well nourished, healthy appearance, no acute distress  Eyes:  No proptosis, no stare Thyroid: Pulmonary:  No respiratory distress, no accessory muscle use; normal rate and effort Cardiac:  Normal rate Vascular:  Endocrine:  No stigmata of Cushing's Syndrome Musculoskeletal:  Normal gait, no involuntary movements Neurology:  No tremors Affect/Mood/Psych:  Oriented x 3; normal affect, normal insight/judgement, normal mood  .   Impression:  Available data shows blood sugars still quite elevated despite use of CGM, insulin. Plan:  Will negotiate with him for nutritional counseling.    Jul 19, 2022      in person  PCP:   Dr. Ene Page and Dr. Gamez           GI:  Dr. Quiroga in [place]  CC:   Diabetes             10/2017 cellulitis admission  A1c 13%                            at that time on Basaglar and metformin 1000 mg BID.  .          (Hx kidney stones)    56 yo visits for diabetes.   Last seen May 2021 and April 2022.   Tried CGM - fell off though.   Taking glimepiride 4 mg, one BID but he can take both in the evening.  and metformin 500 mg BID    4/2022 A1c 10.6 %    He finds Contour more accurate than his previous TrueMetrix.   Sugars up to 260 when he had Covid 19 in March, but FBS still elevated. around 200 mg/dl   Meter not available today.    : : Constitutional:  Alert, well nourished, healthy appearance, no acute distress  Eyes:  No proptosis, no stare Thyroid:  Normal to palpation Pulmonary:  No respiratory distress, no accessory muscle use; normal rate and effort Cardiac:  Normal rate Vascular:  Endocrine:  No stigmata of Cushing's Syndrome Musculoskeletal:  Normal gait, no involuntary movements Neurology:  No tremors Affect/Mood/Psych:  Oriented x 3; normal affect, normal insight/judgement, normal mood  .  Impression:  Most recent A1c 10.6% Evolved into Type 1 diabetees. Previously tried GLP-1 agonist, but upset his stomach  Plan:  Basaglar 12 units in PM and Novolog 4 units TID BS > 140 (and having a meal) ROV in November and January.       Apr 06, 2022       PCP:   Dr. Ene Page           GI:  Dr. Quiroga in [place]  CC:   Diabetes  57 yo visits for diabetes.   Last seen May 2021. Taking glimepiride 4 mg, two in PM and metformin 500 mg BID  He believes that it is likely A1c will have drifted up.  : : Constitutional:  Alert, well nourished, healthy appearance, no acute distress  Eyes:  No proptosis, no stare Thyroid: Pulmonary:  No respiratory distress, no accessory muscle use; normal rate and effort Cardiac:  Normal rate Vascular:  Endocrine:  No stigmata of Cushing's Syndrome Musculoskeletal:  Normal gait, no involuntary movements Neurology:  No tremors Affect/Mood/Psych:  Oriented x 3; normal affect, normal insight/judgement, normal mood  .  Imp/Plan:  Labs today, call me one week.  Return by July.       May 05, 2021    telephonic (iPhone does not )  PCP:   Dr. Ene Page           GI:  Dr. Quiroga in [place]   CC:   Diabetes  57 yo visits for diabetes.    Taking glimepiride 4 mg, two in PM and metformin 500 mg BID  Has used Shauna 14; but it tends to fall off, so he is using fingerstick BS and reports afternoon within normal range but fasting still somewhat elevated.  Impression:  A1c trending in good direction. May benefit from addition of GLP-1 and/or SGLT-2 inhibitor.  Plan:  Updated labs at [place] (Rx mailed to him), included repeat Te. ROV in about 3 months.    He will also f/u PCP - Dr. Page.  Annual eye exam   Jan 26, 2021     videochat iPhone  PCP:   Dr. Ene Page           GI:  Dr. Quiroga in [place]   CC:   Diabetes  57 yo visits for diabetes.   At November visit, started on glimepiride and metform and has tolerated them well. No hypoglycemia.  Using Freestyle Shauna 2 as well and he finds it educational and helpful.   A1c yesterday 7.8%, down from 11.4 in November U/A shows significant microalbuminuria.   On ACEI.  Impression:  He has made significant progress in controlling the sugars.   He understands ADA goals and that we will be working on even more improvement. he has had eyes checked, has not needed podiatrist, anticipates a routine GI evaluation.  Plan:  Rx renewed and encouraged him to visit in person by April 2021.     Nov 04, 2020     in person  PCP:   Dr. Ene Page           GI:  Dr. 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  On no medications for the diabetes at the present time.     Took Basaglar in the past.  Examination:  Constitutional:  Alert, well nourished, healthy appearance, no acute distress  Eyes:  No proptosis, no lid lag Thyroid: Pulmonary:  No respiratory distress, no accessory muscle use; normal rate and effort Cardiac:  Normal rate Vascular:  Endocrine:  No stigmata of Cushing's Syndrome Musculoskeletal:  Normal gait, no involuntary movements Neurology:  No tremors Affect/Mood/Psych:  Oriented x 3; normal affect, normal insight/judgement, normal mood  .   Impression:  Likely that diabetes is under loose control as Mr. Hammer has not had time to monitor sugar levels. He seems eager to restart.    Plan:  glimepiride 4 mg bId metformin 500 mg BID Call me on my cell phone over the weekend to review fingerstick sugar and lab results     2018 medications:     Taking OneTouch Verio - Strip as directed In Vitro check sugar twice daily, Notes: dx code E11.9, Taking OneTouch Delica Lancets Fine - Miscellaneous as directed subcutaneously check sugar twice daily, Notes: dx code E11.9, Taking Aspir-81 81 MG Tablet Delayed Release 1 tablet Orally Once a day, Taking Lisinopril 5 MG Tablet 1 tablet Orally Once a day, Taking MetFORMIN HCl  MG Tablet Extended Release 24 Hour 2 tablets Orally twice daily with food, Taking Trulicity 0.75 MG/0.5ML Solution Pen-injector as directed Subcutaneous once weekly, Taking Dexamethasone 1 MG Tablet 1 tablet Orally take at 11pm the night before blood work, Taking Basaglar KwikPen 100 UNIT/ML Solution Pen-injector 16 units Subcutaneous bedtime, Taking Ergocalciferol 12550 UNIT Capsule 1 capsule Orally once weekly, Not-Taking Trulicity 0.75 MG/0.5ML Solution Pen-injector 0.5 ml Subcutaneous once weekly, Not-Taking Victoza 18 MG/3ML Solution Pen-injector 0.6mg daily x 1 week then increase to 1.2mg daily subcutaneously , Not-Taking Acidophilus - Capsule Orally

## 2025-02-12 NOTE — ASSESSMENT
[FreeTextEntry1] :  Shauna reviewed, TIR 1%; BG have been lower early Jan, attributes recent increase to illness and cough medication. Will plan to review Shauna in 2 weeks, if still as high will initiate SGLT2. Counseled re action and s/e of SGLT2 Continue with same agents for now

## 2025-02-12 NOTE — HISTORY OF PRESENT ILLNESS
[FreeTextEntry1] : Diagnosed in 2017  Diabetes associated complications: PN b/L Last eye exam: 2-2.5 yrs ago  Podiatry: 5/2024  Travelling less for work. Has been sick since the beginning of the year, stomach bug/URI.   Home diabetes medications: Basaglar 50 units HS Novolog 20 units pre meals (skips dose if he skips a meal)  Metformin 500 mg x2 BID (4 tabs a day) Trulicity 1.5 mg weekly  Medications previously tried: Ozempic 0.50 mg weekly - STOPPED due to GI upset  Glimepiride 4 mg BID   SMBG/CGM: Shauna  14 days average 276, %CV 18.9% >250  78% 181-250  21%   1% <70  0%  Hypoglycemia: none Severe hypoglycemia: none   Dietary patterns: Low carb 2-3 meals per day     Labs: 5/2024 A1C: 10.3% (5/2024), 10.1% (10/2024) 9% (1/2025) 1/2025 Creatinine:  0.68 GFR: 106

## 2025-04-08 NOTE — HISTORY OF PRESENT ILLNESS
[FreeTextEntry1] : = = Apr 08, 2025          - Videochat using [employer]   CC:   Diabetes             (BMI 41)               10/2017 cellulitis admission  A1c 13%;   8/2023:  8.2%         1/2025  9%                          at that time on Basaglar and metformin 1000 mg BID.  .                        urine microal/creat ratio 343           (Hx kidney stones)             1/12/2025 25 [place] vit D 14.3   61 yo visits for diabetes.  A1c in November 2022 was 10%;  August 2023  8.2% [place] daughters in [place]   Recently (1/2025)  had various illnesses, with GI symptoms, URI.  He remains on   Basaglar 40-50 units in PM   and Novolog 20  (and having a meal) -> 50 units (now Semglee) Insulin Aspart Flexpen 10-15 units TID  glimepiride 4 mg BID - STOPPED   metformin 500 mg x2   BID (4 tabs a day)  Ozempic 0.50 mg weekly -> diarrhea.  - STOPPED  -> Trulicity  0.75 mg/week -> 1.5 mg weekly  (Mounjaro not covered)   Jardiance 10 mg since early March  - n      Libre3 data reviewed.  GMI 9.     Imp/Plan:  BS improving.    Shauna data reviewed.   May need even more long acting insulin.     Jan 27, 2025    in person  PCP:  Dr. Dennis Gamez   CC:   Diabetes             (BMI 41)               10/2017 cellulitis admission  A1c 13%;   8/2023:  8.2%         1/2025  9%                          at that time on Basaglar and metformin 1000 mg BID.  .                        urine microal/creat ratio 343           (Hx kidney stones)             1/12/2025 25 [place] vit D 14.3   61 yo visits for diabetes.  A1c in November 2022 was 10%;  August 2023  8.2% [place] in [place]   Recently (1/2025)  had various illnesses, with GI symptoms, URI.  He remains on   Basaglar 40-50 units in PM   and Novolog 4 -> 8 units TID BS > 140 (and having a meal) -> 50 units (now Semglee) Insulin Aspart Flexpen 10-15 units TID  glimepiride 4 mg BID - STOPPED   metformin 500 mg x2   BID (4 tabs a day)  Ozempic 0.50 mg weekly -> diarrhea.  - STOPPED  -> Trulicity  0.75 mg/week -> 1.5 mg weekly  (Mounjaro not covered)    Libre3 data reviewed.  GMI 9.   Reproducible spikes after meals.  : : Constitutional:  Alert, well nourished, healthy appearance, no acute distress  Eyes:  No proptosis, no stare Thyroid:  normal to palpation Pulmonary:  No respiratory distress, no accessory muscle use; normal rate and effort Cardiac:  Normal rate Vascular:  Endocrine:  No stigmata of Cushings Syndrome Musculoskeletal:  Normal gait, no involuntary movements Neurology:  No tremors Affect/Mood/Psych:  Oriented x 3; normal affect, normal insight/judgement, normal mood  . Impression:  Very sensitve to carbs. Te bacck up as stress has decreased. Traveling less. Plan:  Same Rx.   He has found input from CDE-KENA Baca very helpful      Aug 05, 2024     in person    PCP:  Dr. Ene Page    CC:   Diabetes             (BMI 41)               10/2017 cellulitis admission  A1c 13%;   8/2023:  8.2%                            at that time on Basaglar and metformin 1000 mg BID.  .                        urine microal/creat ratio 343           (Hx kidney stones)  58 yo visits for diabetes.  A1c in November 2022 was 10%;  August 2023  8.2% Two daughters in [facility].     He remains on   Basaglar 26 -> 12 units in PM-> 28 units  and Novolog 4 -> 8 units TID BS > 140 (and having a meal)  glimepiride 4 mg BID  metformin 500 mg x2   BID (4 tabs a day)  Ozempic 0.50 mg weekly - STOPPED    : : Constitutional:  Alert, well nourished, healthy appearance, no acute distress  Eyes:  No proptosis, no stare Thyroid: Pulmonary:  No respiratory distress, no accessory muscle use; normal rate and effort Cardiac:  Normal rate Vascular:  Endocrine:  No stigmata of Cushings Syndrome Musculoskeletal:  Normal gait, no involuntary movements Neurology:  No tremors Affect/Mood/Psych:  Oriented x 3; normal affect, normal insight/judgement, normal mood  . Impression:  Overworked. A1c over 10% Needs fewer carbs, more insulin, more activity.  Plan: In short run, increase Lantus by 2 units every PM until FBS better. f/u to NP CHASITY Baca.      - May 06, 2024      in person     PCP:  Dr. Ene Page    CC:   Diabetes             (BMI 41)               10/2017 cellulitis admission  A1c 13%;   8/2023:  8.2%                            at that time on Basaglar and metformin 1000 mg BID.  .                        urine microal/creat ratio 343           (Hx kidney stones)  58 yo visits for diabetes.  A1c in November 2022 was 10%;  August 2023  8.2%  He has been traveling for work and developed a vitral syndrome (home Covid test negative) Some GI upset.    He remains on   Basaglar 26 -> 12 units in PM and Novolog 4 -> 8 units TID BS > 140 (and having a meal)  glimepiride 4 mg BID  metformin 500 mg x2   BID (4 tabs a day)  Ozempic 0.50 mg weekly - STOPPED    Impression/Plan;  Appears to be doing excellent job of monitoring and controlling diabetes. Same Rx for now sans the Ozempic. Updated A1c today     Sep 18, 2023      vid chat (last minute change b/o virus symptoms)   PCP:  Dr. Ene Page    CC:   Diabetes             (BMI 41)               10/2017 cellulitis admission  A1c 13%;   8/2023:  8.2%                            at that time on Basaglar and metformin 1000 mg BID.  .                        urine microal/creat ratio 343           (Hx kidney stones)  59 yo visits for diabetes.  A1c in November 2022 was 10%;  August 2023  8.2%  He has been traveling for work and developed a vitral syndrome (home Covid test negative) Some GI upset.     Basaglar 26 -> 12 units in PM and Novolog 4 -> 8 units TID BS > 140 (and having a meal)  glimepiride 4 mg BID  metformin 500 mg x2   BID (4 tabs a day)  Ozempic 0.50 mg weekly   Impression:  Using Freestyle Shauna 3 ([facility])  Reports sugars in good range. Last A1c was down to 8.2% so will aim for next A1c prior to December visit. Still needs to see ophthalmology.  Plan:  Updated labs prior to December visit. Can consider SGLT-2 now that sugars are under better control     Aug 21, 2023      in person       his company is [employer] a [employer]  PCP:  Dr. Ene Page    CC:   Diabetes             (BMI 41)               10/2017 cellulitis admission  A1c 13%                            at that time on Basaglar and metformin 1000 mg BID.  .                        urine microal/creat ratio 343           (Hx kidney stones)  59 yo visits for diabetes.  A1c in November was 10%.  He explains that because of stress and work, he has not had time to focus on the diabetes.     Basaglar 26 units in PM and Novolog 4 units TID BS > 140 (and having a meal)  glimepiride 4 mg BID  metformin 500 mg BID  Ozempic 0.50  : : Constitutional:  Alert, well nourished, healthy appearance, no acute distress  Eyes:  No proptosis, no stare Thyroid: Pulmonary:  No respiratory distress, no accessory muscle use; normal rate and effort Cardiac:  Normal rate Vascular:  Endocrine:  No stigmata of Cushings Syndrome Musculoskeletal:  Normal gait, no involuntary movements Neurology:  No tremors Affect/Mood/Psych:  Oriented x 3; normal affect, normal insight/judgement, normal mood  . Impression:  Continues to suffer from stress eating. Plan:  Increase Basaglar to generate FBS around 140 Increase Novolog to 6 units TID to prevent spikes after meals  Increase ozempic from 0.5 to 1.0 mg/week.    Annual eye exam.   Imp:   A1c trajectory down from over 10 to 8.2% by attention  to diet.   Plan:  Increae Ozempic to 1.0 mg/week.    Jan 30, 2023  in person  CC:   Diabetes             (BMI 41)               10/2017 cellulitis admission  A1c 13%                            at that time on Basaglar and metformin 1000 mg BID.  .                        urine microal/creat ratio 343           (Hx kidney stones)  59 yo visits for diabetes.  A1c in November was 10%.  He explains that because of stress and work, he has not had time to focus on the diabetes.     Basaglar 26 units in PM and Novolog 4 units TID BS > 140 (and having a meal)  glimepiride 4 mg BID  metformin 500 mg BID  Ozempic 0.25  : : Constitutional:  Alert, well nourished, healthy appearance, no acute distress  Eyes:  No proptosis, no stare Thyroid: Pulmonary:  No respiratory distress, no accessory muscle use; normal rate and effort Cardiac:  Normal rate Vascular:  Endocrine:  No stigmata of Cushing's Syndrome Musculoskeletal:  Normal gait, no involuntary movements Neurology:  No tremors Affect/Mood/Psych:  Oriented x 3; normal affect, normal insight/judgement, normal mood  .  Impression:  Review of Libre3 data shows room to increase Basaglar until  FBS around 140. Increase the Novolog to 6 units.  : : Constitutional:  Alert, well nourished, healthy appearance, no acute distress  Eyes:  No proptosis, no stare Thyroid: Pulmonary:  No respiratory distress, no accessory muscle use; normal rate and effort Cardiac:  Normal rate Vascular:  Endocrine:  No stigmata of Cushing's Syndrome Musculoskeletal:  Normal gait, no involuntary movements Neurology:  No tremors Affect/Mood/Psych:  Oriented x 3; normal affect, normal insight/judgement, normal mood  .  Impression:  Room for improvement. Plan:  [facility] to [facility]in [place].  at [facility].            Jan 30, 2023    in person  CC:   Diabetes             (BMI 41)               10/2017 cellulitis admission  A1c 13%                            at that time on Basaglar and metformin 1000 mg BID.  .                        urine microal/creat ratio 343           (Hx kidney stones)  59 yo visits for diabetes.  A1c in November was 10%, so I encouraged him to come in today.   He explains that because of stress and work, he has not had time to focus on the diabetes.     Basaglar 14 units in PM and Novolog 4 units TID BS > 140 (and having a meal)  glimepiride 4 mg BID  metformin 500 mg BID  In Nov 2020 insulin 21.1 when  In July, when BS was 212, C-peptide was only 4.9    Using Shauna 2 (from [employer]) $25/month    : :  Plan:  Shauna 3 yumiko installed and he will try out the Shauna 3 Even if it is not covered by his insurance, he would like to continue with it.   I have asked him to increase the Basaglar inssulin by 1 unit every 2 days until he is waking up with FBS around 140 mg/dl  Nov 28, 2022    in person  CC:   Diabetes             (BMI 41)               10/2017 cellulitis admission  A1c 13%                            at that time on Basaglar and metformin 1000 mg BID.  .                        urine microal/creat ratio 343           (Hx kidney stones)  59 yo visits for diabetes.   Basaglar 12 units in PM and Novolog 4 units TID BS > 140 (and having a meal)  glimepiride 4 mg BID  metformin 500 mg BID  In Nov 2020 insulin 21.1 when  In July, when BS was 212, C-peptide was only 4.9    Using Shauna 2 (from [employer]) $25/month    : : Constitutional:  Alert, well nourished, healthy appearance, no acute distress  Eyes:  No proptosis, no stare Thyroid: Pulmonary:  No respiratory distress, no accessory muscle use; normal rate and effort Cardiac:  Normal rate Vascular:  Endocrine:  No stigmata of Cushing's Syndrome Musculoskeletal:  Normal gait, no involuntary movements Neurology:  No tremors Affect/Mood/Psych:  Oriented x 3; normal affect, normal insight/judgement, normal mood  .   Impression:  Available data shows blood sugars still quite elevated despite use of CGM, insulin. Plan:  Will negotiate with him for nutritional counseling.    Jul 19, 2022      in person  PCP:   Dr. Ene Page and Dr. Gamez           GI:  Dr. Quiroga in [place]  CC:   Diabetes             10/2017 cellulitis admission  A1c 13%                            at that time on Basaglar and metformin 1000 mg BID.  .          (Hx kidney stones)    56 yo visits for diabetes.   Last seen May 2021 and April 2022.   Tried CGM - fell off though.   Taking glimepiride 4 mg, one BID but he can take both in the evening.  and metformin 500 mg BID    4/2022 A1c 10.6 %    He finds Contour more accurate than his previous TrueMetrix.   Sugars up to 260 when he had Covid 19 in March, but FBS still elevated. around 200 mg/dl   Meter not available today.    : : Constitutional:  Alert, well nourished, healthy appearance, no acute distress  Eyes:  No proptosis, no stare Thyroid:  Normal to palpation Pulmonary:  No respiratory distress, no accessory muscle use; normal rate and effort Cardiac:  Normal rate Vascular:  Endocrine:  No stigmata of Cushing's Syndrome Musculoskeletal:  Normal gait, no involuntary movements Neurology:  No tremors Affect/Mood/Psych:  Oriented x 3; normal affect, normal insight/judgement, normal mood  .  Impression:  Most recent A1c 10.6% Evolved into Type 1 diabetees. Previously tried GLP-1 agonist, but upset his stomach  Plan:  Basaglar 12 units in PM and Novolog 4 units TID BS > 140 (and having a meal) ROV in November and January.       Apr 06, 2022       PCP:   Dr. Ene Page           GI:  Dr. Quiroga in [place]  CC:   Diabetes  57 yo visits for diabetes.   Last seen May 2021. Taking glimepiride 4 mg, two in PM and metformin 500 mg BID  He believes that it is likely A1c will have drifted up.  : : Constitutional:  Alert, well nourished, healthy appearance, no acute distress  Eyes:  No proptosis, no stare Thyroid: Pulmonary:  No respiratory distress, no accessory muscle use; normal rate and effort Cardiac:  Normal rate Vascular:  Endocrine:  No stigmata of Cushing's Syndrome Musculoskeletal:  Normal gait, no involuntary movements Neurology:  No tremors Affect/Mood/Psych:  Oriented x 3; normal affect, normal insight/judgement, normal mood  .  Imp/Plan:  Labs today, call me one week.  Return by July.       May 05, 2021    telephonic (iPhone does not )  PCP:   Dr. Ene Page           GI:  Dr. Quiroga in [place]   CC:   Diabetes  57 yo visits for diabetes.    Taking glimepiride 4 mg, two in PM and metformin 500 mg BID  Has used Shauna 14; but it tends to fall off, so he is using fingerstick BS and reports afternoon within normal range but fasting still somewhat elevated.  Impression:  A1c trending in good direction. May benefit from addition of GLP-1 and/or SGLT-2 inhibitor.  Plan:  Updated labs at [place] (Rx mailed to him), included repeat Te. ROV in about 3 months.    He will also f/u PCP - Dr. Page.  Annual eye exam   Jan 26, 2021     videochat iPhone  PCP:   Dr. Ene Page           GI:  Dr. Quiroga in [place]   CC:   Diabetes  57 yo visits for diabetes.   At November visit, started on glimepiride and metform and has tolerated them well. No hypoglycemia.  Using Freestyle Shauna 2 as well and he finds it educational and helpful.   A1c yesterday 7.8%, down from 11.4 in November U/A shows significant microalbuminuria.   On ACEI.  Impression:  He has made significant progress in controlling the sugars.   He understands ADA goals and that we will be working on even more improvement. he has had eyes checked, has not needed podiatrist, anticipates a routine GI evaluation.  Plan:  Rx renewed and encouraged him to visit in person by April 2021.     Nov 04, 2020     in person  PCP:   Dr. Ene Page           GI:  Dr. 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  On no medications for the diabetes at the present time.     Took Basaglar in the past.  Examination:  Constitutional:  Alert, well nourished, healthy appearance, no acute distress  Eyes:  No proptosis, no lid lag Thyroid: Pulmonary:  No respiratory distress, no accessory muscle use; normal rate and effort Cardiac:  Normal rate Vascular:  Endocrine:  No stigmata of Cushing's Syndrome Musculoskeletal:  Normal gait, no involuntary movements Neurology:  No tremors Affect/Mood/Psych:  Oriented x 3; normal affect, normal insight/judgement, normal mood  .   Impression:  Likely that diabetes is under loose control as Mr. Hammer has not had time to monitor sugar levels. He seems eager to restart.    Plan:  glimepiride 4 mg bId metformin 500 mg BID Call me on my cell phone over the weekend to review fingerstick sugar and lab results     2018 medications:     Taking OneTouch Verio - Strip as directed In Vitro check sugar twice daily, Notes: dx code E11.9, Taking OneTouch Delica Lancets Fine - Miscellaneous as directed subcutaneously check sugar twice daily, Notes: dx code E11.9, Taking Aspir-81 81 MG Tablet Delayed Release 1 tablet Orally Once a day, Taking Lisinopril 5 MG Tablet 1 tablet Orally Once a day, Taking MetFORMIN HCl  MG Tablet Extended Release 24 Hour 2 tablets Orally twice daily with food, Taking Trulicity 0.75 MG/0.5ML Solution Pen-injector as directed Subcutaneous once weekly, Taking Dexamethasone 1 MG Tablet 1 tablet Orally take at 11pm the night before blood work, Taking Basaglar KwikPen 100 UNIT/ML Solution Pen-injector 16 units Subcutaneous bedtime, Taking Ergocalciferol 77918 UNIT Capsule 1 capsule Orally once weekly, Not-Taking Trulicity 0.75 MG/0.5ML Solution Pen-injector 0.5 ml Subcutaneous once weekly, Not-Taking Victoza 18 MG/3ML Solution Pen-injector 0.6mg daily x 1 week then increase to 1.2mg daily subcutaneously , Not-Taking Acidophilus - Capsule Orally

## 2025-04-08 NOTE — HISTORY OF PRESENT ILLNESS
[FreeTextEntry1] : = = Apr 08, 2025          - Videochat using [employer]   CC:   Diabetes             (BMI 41)               10/2017 cellulitis admission  A1c 13%;   8/2023:  8.2%         1/2025  9%                          at that time on Basaglar and metformin 1000 mg BID.  .                        urine microal/creat ratio 343           (Hx kidney stones)             1/12/2025 25 [place] vit D 14.3   59 yo visits for diabetes.  A1c in November 2022 was 10%;  August 2023  8.2% [place] daughters in [place]   Recently (1/2025)  had various illnesses, with GI symptoms, URI.  He remains on   Basaglar 40-50 units in PM   and Novolog 20  (and having a meal) -> 50 units (now Semglee) Insulin Aspart Flexpen 10-15 units TID  glimepiride 4 mg BID - STOPPED   metformin 500 mg x2   BID (4 tabs a day)  Ozempic 0.50 mg weekly -> diarrhea.  - STOPPED  -> Trulicity  0.75 mg/week -> 1.5 mg weekly  (Mounjaro not covered)   Jardiance 10 mg since early March  - n      Libre3 data reviewed.  GMI 9.     Imp/Plan:  BS improving.    Shauna data reviewed.   May need even more long acting insulin.     Jan 27, 2025    in person  PCP:  Dr. Dennis Gamez   CC:   Diabetes             (BMI 41)               10/2017 cellulitis admission  A1c 13%;   8/2023:  8.2%         1/2025  9%                          at that time on Basaglar and metformin 1000 mg BID.  .                        urine microal/creat ratio 343           (Hx kidney stones)             1/12/2025 25 [place] vit D 14.3   59 yo visits for diabetes.  A1c in November 2022 was 10%;  August 2023  8.2% [place] in [place]   Recently (1/2025)  had various illnesses, with GI symptoms, URI.  He remains on   Basaglar 40-50 units in PM   and Novolog 4 -> 8 units TID BS > 140 (and having a meal) -> 50 units (now Semglee) Insulin Aspart Flexpen 10-15 units TID  glimepiride 4 mg BID - STOPPED   metformin 500 mg x2   BID (4 tabs a day)  Ozempic 0.50 mg weekly -> diarrhea.  - STOPPED  -> Trulicity  0.75 mg/week -> 1.5 mg weekly  (Mounjaro not covered)    Libre3 data reviewed.  GMI 9.   Reproducible spikes after meals.  : : Constitutional:  Alert, well nourished, healthy appearance, no acute distress  Eyes:  No proptosis, no stare Thyroid:  normal to palpation Pulmonary:  No respiratory distress, no accessory muscle use; normal rate and effort Cardiac:  Normal rate Vascular:  Endocrine:  No stigmata of Cushings Syndrome Musculoskeletal:  Normal gait, no involuntary movements Neurology:  No tremors Affect/Mood/Psych:  Oriented x 3; normal affect, normal insight/judgement, normal mood  . Impression:  Very sensitve to carbs. Te bacck up as stress has decreased. Traveling less. Plan:  Same Rx.   He has found input from CDE-KENA Baca very helpful      Aug 05, 2024     in person    PCP:  Dr. Ene Page    CC:   Diabetes             (BMI 41)               10/2017 cellulitis admission  A1c 13%;   8/2023:  8.2%                            at that time on Basaglar and metformin 1000 mg BID.  .                        urine microal/creat ratio 343           (Hx kidney stones)  60 yo visits for diabetes.  A1c in November 2022 was 10%;  August 2023  8.2% Two daughters in [facility].     He remains on   Basaglar 26 -> 12 units in PM-> 28 units  and Novolog 4 -> 8 units TID BS > 140 (and having a meal)  glimepiride 4 mg BID  metformin 500 mg x2   BID (4 tabs a day)  Ozempic 0.50 mg weekly - STOPPED    : : Constitutional:  Alert, well nourished, healthy appearance, no acute distress  Eyes:  No proptosis, no stare Thyroid: Pulmonary:  No respiratory distress, no accessory muscle use; normal rate and effort Cardiac:  Normal rate Vascular:  Endocrine:  No stigmata of Cushings Syndrome Musculoskeletal:  Normal gait, no involuntary movements Neurology:  No tremors Affect/Mood/Psych:  Oriented x 3; normal affect, normal insight/judgement, normal mood  . Impression:  Overworked. A1c over 10% Needs fewer carbs, more insulin, more activity.  Plan: In short run, increase Lantus by 2 units every PM until FBS better. f/u to NP CHASITY Baca.      - May 06, 2024      in person     PCP:  Dr. Ene Page    CC:   Diabetes             (BMI 41)               10/2017 cellulitis admission  A1c 13%;   8/2023:  8.2%                            at that time on Basaglar and metformin 1000 mg BID.  .                        urine microal/creat ratio 343           (Hx kidney stones)  60 yo visits for diabetes.  A1c in November 2022 was 10%;  August 2023  8.2%  He has been traveling for work and developed a vitral syndrome (home Covid test negative) Some GI upset.    He remains on   Basaglar 26 -> 12 units in PM and Novolog 4 -> 8 units TID BS > 140 (and having a meal)  glimepiride 4 mg BID  metformin 500 mg x2   BID (4 tabs a day)  Ozempic 0.50 mg weekly - STOPPED    Impression/Plan;  Appears to be doing excellent job of monitoring and controlling diabetes. Same Rx for now sans the Ozempic. Updated A1c today     Sep 18, 2023      vid chat (last minute change b/o virus symptoms)   PCP:  Dr. Ene Page    CC:   Diabetes             (BMI 41)               10/2017 cellulitis admission  A1c 13%;   8/2023:  8.2%                            at that time on Basaglar and metformin 1000 mg BID.  .                        urine microal/creat ratio 343           (Hx kidney stones)  59 yo visits for diabetes.  A1c in November 2022 was 10%;  August 2023  8.2%  He has been traveling for work and developed a vitral syndrome (home Covid test negative) Some GI upset.     Basaglar 26 -> 12 units in PM and Novolog 4 -> 8 units TID BS > 140 (and having a meal)  glimepiride 4 mg BID  metformin 500 mg x2   BID (4 tabs a day)  Ozempic 0.50 mg weekly   Impression:  Using Freestyle Shauna 3 ([facility])  Reports sugars in good range. Last A1c was down to 8.2% so will aim for next A1c prior to December visit. Still needs to see ophthalmology.  Plan:  Updated labs prior to December visit. Can consider SGLT-2 now that sugars are under better control     Aug 21, 2023      in person       his company is [employer] a [employer]  PCP:  Dr. Ene Page    CC:   Diabetes             (BMI 41)               10/2017 cellulitis admission  A1c 13%                            at that time on Basaglar and metformin 1000 mg BID.  .                        urine microal/creat ratio 343           (Hx kidney stones)  59 yo visits for diabetes.  A1c in November was 10%.  He explains that because of stress and work, he has not had time to focus on the diabetes.     Basaglar 26 units in PM and Novolog 4 units TID BS > 140 (and having a meal)  glimepiride 4 mg BID  metformin 500 mg BID  Ozempic 0.50  : : Constitutional:  Alert, well nourished, healthy appearance, no acute distress  Eyes:  No proptosis, no stare Thyroid: Pulmonary:  No respiratory distress, no accessory muscle use; normal rate and effort Cardiac:  Normal rate Vascular:  Endocrine:  No stigmata of Cushings Syndrome Musculoskeletal:  Normal gait, no involuntary movements Neurology:  No tremors Affect/Mood/Psych:  Oriented x 3; normal affect, normal insight/judgement, normal mood  . Impression:  Continues to suffer from stress eating. Plan:  Increase Basaglar to generate FBS around 140 Increase Novolog to 6 units TID to prevent spikes after meals  Increase ozempic from 0.5 to 1.0 mg/week.    Annual eye exam.   Imp:   A1c trajectory down from over 10 to 8.2% by attention  to diet.   Plan:  Increae Ozempic to 1.0 mg/week.    Jan 30, 2023  in person  CC:   Diabetes             (BMI 41)               10/2017 cellulitis admission  A1c 13%                            at that time on Basaglar and metformin 1000 mg BID.  .                        urine microal/creat ratio 343           (Hx kidney stones)  59 yo visits for diabetes.  A1c in November was 10%.  He explains that because of stress and work, he has not had time to focus on the diabetes.     Basaglar 26 units in PM and Novolog 4 units TID BS > 140 (and having a meal)  glimepiride 4 mg BID  metformin 500 mg BID  Ozempic 0.25  : : Constitutional:  Alert, well nourished, healthy appearance, no acute distress  Eyes:  No proptosis, no stare Thyroid: Pulmonary:  No respiratory distress, no accessory muscle use; normal rate and effort Cardiac:  Normal rate Vascular:  Endocrine:  No stigmata of Cushing's Syndrome Musculoskeletal:  Normal gait, no involuntary movements Neurology:  No tremors Affect/Mood/Psych:  Oriented x 3; normal affect, normal insight/judgement, normal mood  .  Impression:  Review of Libre3 data shows room to increase Basaglar until  FBS around 140. Increase the Novolog to 6 units.  : : Constitutional:  Alert, well nourished, healthy appearance, no acute distress  Eyes:  No proptosis, no stare Thyroid: Pulmonary:  No respiratory distress, no accessory muscle use; normal rate and effort Cardiac:  Normal rate Vascular:  Endocrine:  No stigmata of Cushing's Syndrome Musculoskeletal:  Normal gait, no involuntary movements Neurology:  No tremors Affect/Mood/Psych:  Oriented x 3; normal affect, normal insight/judgement, normal mood  .  Impression:  Room for improvement. Plan:  [facility] from [facility] to [facility]in [place].  at [facility].            Jan 30, 2023    in person  CC:   Diabetes             (BMI 41)               10/2017 cellulitis admission  A1c 13%                            at that time on Basaglar and metformin 1000 mg BID.  .                        urine microal/creat ratio 343           (Hx kidney stones)  59 yo visits for diabetes.  A1c in November was 10%, so I encouraged him to come in today.   He explains that because of stress and work, he has not had time to focus on the diabetes.     Basaglar 14 units in PM and Novolog 4 units TID BS > 140 (and having a meal)  glimepiride 4 mg BID  metformin 500 mg BID  In Nov 2020 insulin 21.1 when  In July, when BS was 212, C-peptide was only 4.9    Using Shauna 2 (from [employer]) $25/month    : :  Plan:  Shauna 3 yumiko installed and he will try out the Shauna 3 Even if it is not covered by his insurance, he would like to continue with it.   I have asked him to increase the Basaglar inssulin by 1 unit every 2 days until he is waking up with FBS around 140 mg/dl  Nov 28, 2022    in person  CC:   Diabetes             (BMI 41)               10/2017 cellulitis admission  A1c 13%                            at that time on Basaglar and metformin 1000 mg BID.  .                        urine microal/creat ratio 343           (Hx kidney stones)  59 yo visits for diabetes.   Basaglar 12 units in PM and Novolog 4 units TID BS > 140 (and having a meal)  glimepiride 4 mg BID  metformin 500 mg BID  In Nov 2020 insulin 21.1 when  In July, when BS was 212, C-peptide was only 4.9    Using Shauna 2 (from [employer]) $25/month    : : Constitutional:  Alert, well nourished, healthy appearance, no acute distress  Eyes:  No proptosis, no stare Thyroid: Pulmonary:  No respiratory distress, no accessory muscle use; normal rate and effort Cardiac:  Normal rate Vascular:  Endocrine:  No stigmata of Cushing's Syndrome Musculoskeletal:  Normal gait, no involuntary movements Neurology:  No tremors Affect/Mood/Psych:  Oriented x 3; normal affect, normal insight/judgement, normal mood  .   Impression:  Available data shows blood sugars still quite elevated despite use of CGM, insulin. Plan:  Will negotiate with him for nutritional counseling.    Jul 19, 2022      in person  PCP:   Dr. Ene Page and Dr. Gamez           GI:  Dr. Quiroga in [place]  CC:   Diabetes             10/2017 cellulitis admission  A1c 13%                            at that time on Basaglar and metformin 1000 mg BID.  .          (Hx kidney stones)    58 yo visits for diabetes.   Last seen May 2021 and April 2022.   Tried CGM - fell off though.   Taking glimepiride 4 mg, one BID but he can take both in the evening.  and metformin 500 mg BID    4/2022 A1c 10.6 %    He finds Contour more accurate than his previous TrueMetrix.   Sugars up to 260 when he had Covid 19 in March, but FBS still elevated. around 200 mg/dl   Meter not available today.    : : Constitutional:  Alert, well nourished, healthy appearance, no acute distress  Eyes:  No proptosis, no stare Thyroid:  Normal to palpation Pulmonary:  No respiratory distress, no accessory muscle use; normal rate and effort Cardiac:  Normal rate Vascular:  Endocrine:  No stigmata of Cushing's Syndrome Musculoskeletal:  Normal gait, no involuntary movements Neurology:  No tremors Affect/Mood/Psych:  Oriented x 3; normal affect, normal insight/judgement, normal mood  .  Impression:  Most recent A1c 10.6% Evolved into Type 1 diabetees. Previously tried GLP-1 agonist, but upset his stomach  Plan:  Basaglar 12 units in PM and Novolog 4 units TID BS > 140 (and having a meal) ROV in November and January.       Apr 06, 2022       PCP:   Dr. Ene Page           GI:  Dr. Quiroga in [place]  CC:   Diabetes  57 yo visits for diabetes.   Last seen May 2021. Taking glimepiride 4 mg, two in PM and metformin 500 mg BID  He believes that it is likely A1c will have drifted up.  : : Constitutional:  Alert, well nourished, healthy appearance, no acute distress  Eyes:  No proptosis, no stare Thyroid: Pulmonary:  No respiratory distress, no accessory muscle use; normal rate and effort Cardiac:  Normal rate Vascular:  Endocrine:  No stigmata of Cushing's Syndrome Musculoskeletal:  Normal gait, no involuntary movements Neurology:  No tremors Affect/Mood/Psych:  Oriented x 3; normal affect, normal insight/judgement, normal mood  .  Imp/Plan:  Labs today, call me one week.  Return by July.       May 05, 2021    telephonic (iPhone does not )  PCP:   Dr. Ene Page           GI:  Dr. Quiroga in [place]   CC:   Diabetes  57 yo visits for diabetes.    Taking glimepiride 4 mg, two in PM and metformin 500 mg BID  Has used Shauna 14; but it tends to fall off, so he is using fingerstick BS and reports afternoon within normal range but fasting still somewhat elevated.  Impression:  A1c trending in good direction. May benefit from addition of GLP-1 and/or SGLT-2 inhibitor.  Plan:  Updated labs at [facility] (Rx mailed to him), included repeat Te. ROV in about 3 months.    He will also f/u PCP - Dr. Page.  Annual eye exam   Jan 26, 2021     videochat iPhone  PCP:   Dr. Ene Page           GI:  Dr. Quiroga in [place]   CC:   Diabetes  57 yo visits for diabetes.   At November visit, started on glimepiride and metform and has tolerated them well. No hypoglycemia.  Using Freestyle Shauna 2 as well and he finds it educational and helpful.   A1c yesterday 7.8%, down from 11.4 in November U/A shows significant microalbuminuria.   On ACEI.  Impression:  He has made significant progress in controlling the sugars.   He understands ADA goals and that we will be working on even more improvement. he has had eyes checked, has not needed podiatrist, anticipates a routine GI evaluation.  Plan:  Rx renewed and encouraged him to visit in person by April 2021.     Nov 04, 2020     in person  PCP:   Dr. Ene Page           GI:  Dr. 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  On no medications for the diabetes at the present time.     Took Basaglar in the past.  Examination:  Constitutional:  Alert, well nourished, healthy appearance, no acute distress  Eyes:  No proptosis, no lid lag Thyroid: Pulmonary:  No respiratory distress, no accessory muscle use; normal rate and effort Cardiac:  Normal rate Vascular:  Endocrine:  No stigmata of Cushing's Syndrome Musculoskeletal:  Normal gait, no involuntary movements Neurology:  No tremors Affect/Mood/Psych:  Oriented x 3; normal affect, normal insight/judgement, normal mood  .   Impression:  Likely that diabetes is under loose control as Mr. Hammer has not had time to monitor sugar levels. He seems eager to restart.    Plan:  glimepiride 4 mg bId metformin 500 mg BID Call me on my cell phone over the weekend to review fingerstick sugar and lab results     2018 medications:     Taking OneTouch Verio - Strip as directed In Vitro check sugar twice daily, Notes: dx code E11.9, Taking OneTouch Delica Lancets Fine - Miscellaneous as directed subcutaneously check sugar twice daily, Notes: dx code E11.9, Taking Aspir-81 81 MG Tablet Delayed Release 1 tablet Orally Once a day, Taking Lisinopril 5 MG Tablet 1 tablet Orally Once a day, Taking MetFORMIN HCl  MG Tablet Extended Release 24 Hour 2 tablets Orally twice daily with food, Taking Trulicity 0.75 MG/0.5ML Solution Pen-injector as directed Subcutaneous once weekly, Taking Dexamethasone 1 MG Tablet 1 tablet Orally take at 11pm the night before blood work, Taking Basaglar KwikPen 100 UNIT/ML Solution Pen-injector 16 units Subcutaneous bedtime, Taking Ergocalciferol 35423 UNIT Capsule 1 capsule Orally once weekly, Not-Taking Trulicity 0.75 MG/0.5ML Solution Pen-injector 0.5 ml Subcutaneous once weekly, Not-Taking Victoza 18 MG/3ML Solution Pen-injector 0.6mg daily x 1 week then increase to 1.2mg daily subcutaneously , Not-Taking Acidophilus - Capsule Orally